# Patient Record
Sex: FEMALE | Race: WHITE | NOT HISPANIC OR LATINO | URBAN - METROPOLITAN AREA
[De-identification: names, ages, dates, MRNs, and addresses within clinical notes are randomized per-mention and may not be internally consistent; named-entity substitution may affect disease eponyms.]

---

## 2024-11-01 ENCOUNTER — OFFICE VISIT (OUTPATIENT)
Dept: URBAN - METROPOLITAN AREA CLINIC 9 | Facility: CLINIC | Age: 64
End: 2024-11-01

## 2024-11-07 ENCOUNTER — DASHBOARD ENCOUNTERS (OUTPATIENT)
Age: 64
End: 2024-11-07

## 2024-11-07 ENCOUNTER — OFFICE VISIT (OUTPATIENT)
Dept: URBAN - METROPOLITAN AREA CLINIC 9 | Facility: CLINIC | Age: 64
End: 2024-11-07
Payer: COMMERCIAL

## 2024-11-07 VITALS
HEIGHT: 66 IN | DIASTOLIC BLOOD PRESSURE: 87 MMHG | SYSTOLIC BLOOD PRESSURE: 122 MMHG | WEIGHT: 180.4 LBS | BODY MASS INDEX: 28.99 KG/M2

## 2024-11-07 DIAGNOSIS — Z80.0 FAMILY HISTORY OF STOMACH CANCER: ICD-10-CM

## 2024-11-07 DIAGNOSIS — Z83.719 FAMILY HISTORY OF POLYPS IN THE COLON: ICD-10-CM

## 2024-11-07 DIAGNOSIS — Z86.0101 HISTORY OF ADENOMATOUS POLYP OF COLON: ICD-10-CM

## 2024-11-07 PROCEDURE — 99203 OFFICE O/P NEW LOW 30 MIN: CPT | Performed by: STUDENT IN AN ORGANIZED HEALTH CARE EDUCATION/TRAINING PROGRAM

## 2024-11-07 RX ORDER — ATORVASTATIN CALCIUM 10 MG/1
1 TABLET TABLET, FILM COATED ORAL ONCE A DAY
Status: ACTIVE | COMMUNITY

## 2024-11-07 RX ORDER — ENALAPRIL MALEATE 5 MG
1 TABLET TABLET ORAL ONCE A DAY
Status: ACTIVE | COMMUNITY

## 2024-11-07 RX ORDER — TRIAMTERENE AND HYDROCHLOROTHIAZIDE 37.5; 25 MG/1; MG/1
1 TABLET IN THE MORNING TABLET ORAL ONCE A DAY
Status: ACTIVE | COMMUNITY

## 2024-11-07 NOTE — HPI-TODAY'S VISIT:
EGD:   Colonoscopy: Every 1 year? colonoscopies. 2018?-- [place]- polyps- no records  Imaging/Studies/Procedures: 9/4/24- CMP, TSH, normal.   - -   PMH:    Family Hx:   GI Hx: 11/7/24- smoker-COPD The patient has a history of frequent colonoscopies due to the presence of polyps. The exact frequency of these procedures is unclear, with the patient initially suggesting they were yearly, but later suggesting they may have been every three years. The patient had an incident of significant bleeding after a colonoscopy, which led to a hospital visit. This incident has caused the patient to be apprehensive about undergoing further colonoscopies. The patient also mentioned a family history of prostate and stomach or pancreatic cancer. However, the patient does not report any upper GI symptoms such as nausea, vomiting, or reflux.

## 2024-12-16 ENCOUNTER — CLAIMS CREATED FROM THE CLAIM WINDOW (OUTPATIENT)
Dept: URBAN - METROPOLITAN AREA SURGERY CENTER 9 | Facility: SURGERY CENTER | Age: 64
End: 2024-12-16
Payer: COMMERCIAL

## 2024-12-16 DIAGNOSIS — K62.89 OTHER SPECIFIED DISEASES OF ANUS AND RECTUM: ICD-10-CM

## 2024-12-16 DIAGNOSIS — K64.0 FIRST DEGREE HEMORRHOIDS: ICD-10-CM

## 2024-12-16 DIAGNOSIS — Z86.0101 HISTORY OF ADENOMATOUS POLYP OF COLON: ICD-10-CM

## 2024-12-16 DIAGNOSIS — K57.30 DIVERTICULOSIS OF SIGMOID COLON: ICD-10-CM

## 2024-12-16 DIAGNOSIS — K63.5 POLYP OF ASCENDING COLON, UNSPECIFIED TYPE: ICD-10-CM

## 2024-12-16 DIAGNOSIS — K57.30 DIVERTICULOSIS OF LARGE INTESTINE WITHOUT PERFORATION OR ABSCESS WITHOUT BLEEDING: ICD-10-CM

## 2024-12-16 DIAGNOSIS — Z12.11 ENCOUNTER FOR COLONOSCOPY DUE TO HISTORY OF COLONIC POLYP: ICD-10-CM

## 2024-12-16 DIAGNOSIS — K64.8 OTHER HEMORRHOIDS: ICD-10-CM

## 2024-12-16 DIAGNOSIS — Z86.0101 PERSONAL HISTORY OF ADENOMATOUS AND SERRATED COLON POLYPS: ICD-10-CM

## 2024-12-16 PROCEDURE — 0529F INTRVL 3/>YR PTS CLNSCP DOCD: CPT | Performed by: STUDENT IN AN ORGANIZED HEALTH CARE EDUCATION/TRAINING PROGRAM

## 2024-12-16 PROCEDURE — 45385 COLONOSCOPY W/LESION REMOVAL: CPT | Performed by: STUDENT IN AN ORGANIZED HEALTH CARE EDUCATION/TRAINING PROGRAM

## 2024-12-16 PROCEDURE — 00812 ANES LWR INTST SCR COLSC: CPT | Performed by: NURSE ANESTHETIST, CERTIFIED REGISTERED

## 2024-12-16 PROCEDURE — 45380 COLONOSCOPY AND BIOPSY: CPT | Performed by: STUDENT IN AN ORGANIZED HEALTH CARE EDUCATION/TRAINING PROGRAM

## 2024-12-16 RX ORDER — TRIAMTERENE AND HYDROCHLOROTHIAZIDE 37.5; 25 MG/1; MG/1
1 TABLET IN THE MORNING TABLET ORAL ONCE A DAY
Status: ACTIVE | COMMUNITY

## 2024-12-16 RX ORDER — ATORVASTATIN CALCIUM 10 MG/1
1 TABLET TABLET, FILM COATED ORAL ONCE A DAY
Status: ACTIVE | COMMUNITY

## 2024-12-16 RX ORDER — ENALAPRIL MALEATE 5 MG
1 TABLET TABLET ORAL ONCE A DAY
Status: ACTIVE | COMMUNITY